# Patient Record
Sex: MALE | Race: WHITE | URBAN - METROPOLITAN AREA
[De-identification: names, ages, dates, MRNs, and addresses within clinical notes are randomized per-mention and may not be internally consistent; named-entity substitution may affect disease eponyms.]

---

## 2021-04-07 LAB
EST. AVERAGE GLUCOSE BLD GHB EST-MCNC: 84 MG/DL (ref 70–115)
HBA1C MFR BLD: 4.8 % (ref 4–6)

## 2022-01-05 LAB
ALBUMIN SERPL-MCNC: 4.6 G/DL (ref 3.4–5)
ALBUMIN/GLOB SERPL: 1.8 {RATIO}
ALP SERPL-CCNC: 65 U/L (ref 50–144)
ALT SERPL-CCNC: 14 U/L (ref 1–45)
ANION GAP SERPL CALC-SCNC: 5 MMOL/L (ref 2–13)
AST SERPL-CCNC: 24 U/L (ref 17–59)
BASOPHILS # BLD AUTO: 0.03 X10(3)/MCL (ref 0.01–0.08)
BASOPHILS NFR BLD AUTO: 0.5 % (ref 0.1–1.2)
BILIRUB SERPL-MCNC: 0.85 MG/DL (ref 0–1)
BUN SERPL-MCNC: 8 MG/DL (ref 7–20)
CALCIUM SERPL-MCNC: 9.4 MG/DL (ref 8.4–10.2)
CHLORIDE SERPL-SCNC: 104 MMOL/L (ref 94–110)
CO2 SERPL-SCNC: 28 MMOL/L (ref 21–32)
CREAT SERPL-MCNC: 0.81 MG/DL (ref 0.66–1.25)
CREAT/UREA NIT SERPL: 9.9 (ref 12–20)
EOSINOPHIL # BLD AUTO: 0.06 X10(3)/MCL (ref 0.04–0.54)
EOSINOPHIL NFR BLD AUTO: 1.1 % (ref 0.7–7)
ERYTHROCYTE [DISTWIDTH] IN BLOOD BY AUTOMATED COUNT: 12.1 % (ref 11.6–14.4)
EST CREAT CLEARANCE SER (OHS): 105.64 ML/MIN
GLOBULIN SER-MCNC: 2.6 G/DL (ref 2–3.9)
GLUCOSE SERPL-MCNC: 90 MGM./DL (ref 70–115)
HCT VFR BLD AUTO: 44.5 % (ref 36–52)
HGB BLD-MCNC: 15 G/DL (ref 13–18)
IMM GRANULOCYTES # BLD AUTO: 0.02 X10E3/UL (ref 0–0.03)
IMM GRANULOCYTES NFR BLD AUTO: 0.4 % (ref 0–0.5)
LYMPHOCYTES # BLD AUTO: 2.04 X10(3)/MCL (ref 1.32–3.57)
LYMPHOCYTES NFR BLD AUTO: 36 % (ref 20–55)
MCH RBC QN AUTO: 31.2 PG (ref 27–34)
MCHC RBC AUTO-ENTMCNC: 33.7 G/DL (ref 31–37)
MCV RBC AUTO: 92.5 FL (ref 79–99)
MONOCYTES # BLD AUTO: 0.5 X10(3)/MCL (ref 0.3–0.82)
MONOCYTES NFR BLD AUTO: 8.8 % (ref 4.7–12.5)
NEUTROPHILS # BLD AUTO: 3.01 X10(3)/MCL (ref 1.78–5.38)
NEUTROPHILS NFR BLD AUTO: 53.2 % (ref 37–73)
PLATELET # BLD AUTO: 248 X10(3)/MCL (ref 140–371)
PMV BLD AUTO: 8.8 FL (ref 9.4–12.4)
POTASSIUM SERPL-SCNC: 4.2 MMOL/L (ref 3.5–5.1)
PROT SERPL-MCNC: 7.2 G/DL (ref 6.3–8.2)
RBC # BLD AUTO: 4.81 X10(6)/MCL (ref 4–6)
SODIUM SERPL-SCNC: 137 MMOL/L (ref 135–145)
TSH SERPL-ACNC: 0.8 UIU/ML (ref 0.36–3.74)
WBC # SPEC AUTO: 5.7 X10(3)/MCL (ref 4–11.5)

## 2022-04-10 ENCOUNTER — HISTORICAL (OUTPATIENT)
Dept: ADMINISTRATIVE | Facility: HOSPITAL | Age: 37
End: 2022-04-10

## 2022-04-25 VITALS
OXYGEN SATURATION: 97 % | WEIGHT: 133.19 LBS | BODY MASS INDEX: 21.4 KG/M2 | DIASTOLIC BLOOD PRESSURE: 70 MMHG | SYSTOLIC BLOOD PRESSURE: 102 MMHG | HEIGHT: 66 IN

## 2022-05-01 ENCOUNTER — HISTORICAL (OUTPATIENT)
Dept: ADMINISTRATIVE | Facility: HOSPITAL | Age: 37
End: 2022-05-01

## 2022-05-03 NOTE — HISTORICAL [facility] CERNER
This is a historical note converted from [place]. Formatting and pictures may have been removed.  Please reference [place] for original formatting and attached multimedia. Chief Complaint  here to est care.  History of Present Illness  34 y/o WM with PMH of Bipolar disorder here to establish care.  ?  His Bipolar has been managed at [facility] in [place] but at the onset of COVID they stopped in office visits and did telehealth visits and every once in a while a nurse comes to his home to assess. He is currently only on Propranolol and Trazodone. He denies any recent manic episodes; does admit that his depression is not well controlled currently but denies SI/HI. He admits to one previous suicide attempt. Also has some anxiety, mostly during the day, as he is just sitting around and bored. Has good social support, , has a couple good friends. Denies illicit drug use, occasional alcohol and tobacco. Currently unemployed. Very difficult to obtain information from, seems uncomfortable with questions.  ?  Has been taking Trazodone for sleep but does not feel that it is helping much and would like to try something new.  ?  Thinks he is losing weight, he believes that he was about 156# last year and today is 138#  No change in appetite, no abdominal pain, n/v. Has occasional diarrhea but that is a normal bowel pattern for him, only happens when I drink booze. Again he denies excessive ETOH, mostly because of financial constraints.  Review of Systems  Constitutional:?no fever, fatigue  Eye:?no vision changes, eye redness, drainage, or pain  ENMT:?no sore throat, ear pain, sinus pain/congestion, nasal congestion/drainage  Respiratory:?no cough, no wheezing, no shortness of breath  Cardiovascular:?no chest pain, no palpitations, no edema  Gastrointestinal:?no nausea, vomiting, or diarrhea. No abdominal pain  Genitourinary:?no dysuria, no urinary frequency or urgency, no hematuria  Hema/Lymph:?no abnormal bruising or  bleeding  Endocrine:?no heat or cold intolerance, no excessive thirst or excessive urination  Musculoskeletal:?no muscle or joint pain, no joint swelling  Integumentary:?no skin rash or abnormal lesion  Neurologic: no headache, no dizziness  ?  Physical Exam  Vitals & Measurements  T:?36.6? ?C (Temporal Artery)? HR:?67(Peripheral)? BP:?126/82?  HT:?168.00?cm? WT:?62.800?kg? BMI:?22.25?  General:?AAOx3, in no acute distress  Eye: PERRL, clear conjunctiva, eyelids normal  HENT:?TMs/ear canals clear, oropharynx without erythema/exudate  Neck: no thyromegaly or lymphadenopathy; no JVD  Respiratory:?clear to auscultation bilaterally  Cardiovascular:?regular rate and rhythm without murmurs  Gastrointestinal:?soft, non-tender, with normal bowel sounds?  Integumentary: no rashes or skin lesions present, no peripheral edema  Musculoskeletal: full ROM of all extremities /spine without limitation or discomfort; steady gait  Neuro: CN grossly intact  ?  ?  Assessment/Plan  1.?Encounter to establish care ? Z76.89  Labs today and call with results  2.?Bipolar disorder ? F31.9  STOP Trazodone and START Seroquel at bedtime?  Sign medical release form and contact [facility] [phone] to check on FU  ?  FU 2 weeks  3.?Anxiety ? F41.9  ?Place on Psych[facility] schedule, first available  4.?Screening for deficiency anemia ? Z13.0  5.?Screening for thyroid disorder ? Z13.29  6.?Screening for cholesterol level ? Z13.220  Not fasting today, has transportation issues  7.?Tobacco user ? Z72.0  Occasional smoker.  8.?Screening for diabetes mellitus ? Z13.1  Reports?+ family hx   Problem List/Past Medical History  Ongoing  Anxiety  Bipolar disorder  Historical  No qualifying data  Medications  ketoconazole 2% topical cream, 1 ramy, TOP, BID  propranolol 20 mg oral tablet, 20 mg= 1 tab(s), Oral, TID  Seroquel 50 mg oral tablet, 50 mg= 1 tab(s), Oral, Daily  Allergies  No Known Allergies  No Known Medication Allergies  Social History  Abuse/Neglect  No,  04/07/2021  Alcohol  Current, 04/07/2021  Substance Use  Current, 04/07/2021  Tobacco  10 or more cigarettes (1/2 pack or more)/day in last 30 days, No, 14 Years (Age started)., 04/07/2021  Family History  Bipolar: Sister.  Congestive heart disease.: Mother.  Health Maintenance  Health Maintenance  ???Pending?(in the next year)  ??? ??OverDue  ??? ? ? ?Influenza Vaccine due??10/01/20??and every 1??day(s)  ??? ? ? ?Smoking Cessation due??01/01/21??Variable frequency  ??? ? ? ?Alcohol Misuse Screening due??01/02/21??and every 1??year(s)  ??? ??Due?  ??? ? ? ?ADL Screening due??04/08/21??and every 1??year(s)  ??? ? ? ?Tetanus Vaccine due??04/08/21??and every 10??year(s)  ??? ??Due In Future?  ??? ? ? ?Obesity Screening not due until??01/01/22??and every 1??year(s)  ??? ? ? ?Blood Pressure Screening not due until??04/07/22??and every 1??year(s)  ??? ? ? ?Body Mass Index Check not due until??04/07/22??and every 1??year(s)  ???Satisfied?(in the past 1 year)  ??? ??Satisfied?  ??? ? ? ?Blood Pressure Screening on??04/07/21.??Satisfied by Lolis Wilder  ??? ? ? ?Body Mass Index Check on??04/07/21.??Satisfied by Lolis Wilder  ??? ? ? ?Depression Screening on??04/07/21.??Satisfied by Eileen Ochoa  ??? ? ? ?Obesity Screening on??04/07/21.??Satisfied by Lolis Wilder  ?